# Patient Record
Sex: FEMALE | Race: WHITE | NOT HISPANIC OR LATINO | Employment: FULL TIME | URBAN - NONMETROPOLITAN AREA
[De-identification: names, ages, dates, MRNs, and addresses within clinical notes are randomized per-mention and may not be internally consistent; named-entity substitution may affect disease eponyms.]

---

## 2022-01-05 ENCOUNTER — PROCEDURE VISIT (OUTPATIENT)
Dept: OBSTETRICS AND GYNECOLOGY | Facility: CLINIC | Age: 37
End: 2022-01-05

## 2022-01-05 ENCOUNTER — LAB (OUTPATIENT)
Dept: LAB | Facility: HOSPITAL | Age: 37
End: 2022-01-05

## 2022-01-05 VITALS — DIASTOLIC BLOOD PRESSURE: 72 MMHG | SYSTOLIC BLOOD PRESSURE: 124 MMHG | WEIGHT: 167.2 LBS

## 2022-01-05 DIAGNOSIS — Z30.46 ENCOUNTER FOR REMOVAL AND REINSERTION OF NEXPLANON: Primary | ICD-10-CM

## 2022-01-05 DIAGNOSIS — Z83.49 FAMILY HISTORY OF THYROID DISEASE: ICD-10-CM

## 2022-01-05 LAB
B-HCG UR QL: NEGATIVE
EXPIRATION DATE: NORMAL
INTERNAL NEGATIVE CONTROL: NEGATIVE
INTERNAL POSITIVE CONTROL: POSITIVE
Lab: NORMAL
TSH SERPL DL<=0.05 MIU/L-ACNC: 1.77 UIU/ML (ref 0.27–4.2)

## 2022-01-05 PROCEDURE — 99203 OFFICE O/P NEW LOW 30 MIN: CPT | Performed by: STUDENT IN AN ORGANIZED HEALTH CARE EDUCATION/TRAINING PROGRAM

## 2022-01-05 PROCEDURE — 84443 ASSAY THYROID STIM HORMONE: CPT

## 2022-01-05 PROCEDURE — 11983 REMOVE/INSERT DRUG IMPLANT: CPT | Performed by: STUDENT IN AN ORGANIZED HEALTH CARE EDUCATION/TRAINING PROGRAM

## 2022-01-05 PROCEDURE — 36415 COLL VENOUS BLD VENIPUNCTURE: CPT

## 2022-01-05 PROCEDURE — 81025 URINE PREGNANCY TEST: CPT | Performed by: STUDENT IN AN ORGANIZED HEALTH CARE EDUCATION/TRAINING PROGRAM

## 2022-01-05 RX ORDER — MULTIPLE VITAMINS W/ MINERALS TAB 9MG-400MCG
1 TAB ORAL DAILY
COMMUNITY

## 2022-01-05 NOTE — PROGRESS NOTES
[facility]  Gynecology  Date of Service: 2022    CC: desires Nexplanon removal and reinsertion    HPI  Aissatou Sanders is a 36 y.o.  premenopausal female who presents with desire for Nexplanon removal and reinsertion. Nexplanon previously placed 2018 by Ila Anne in [place]. Has had 2 prior and did well with them all. Reports regular periods, lighter with Nexplanon in place. Initially had Nexplanon placed for symptomatic ovarian cysts. States occasionally has pain she attributes to cysts but not as bad and less frequent. Periods occur monthly, 2-3 days are heavier where may change a pad every 2 hours, but then lighter x 3-4 days. Occasional cramping before periods and takes OTC meds.     2004 pap NIL. States last pap in [place] in 2019. Record requested.     Mother and siblings with h/o thyroid disease, usually gets tested regularly but has not for some time. Denies symptoms of hypo/hyperthyroidism, specifically without heat/cold intolerance.    Denies any vaginal itching, burning, irritation, or discharge. Denies any abnormal uterine bleeding. Denies any sexual dysfunction concerns. Denies any urinary symptoms including incontinence, dysuria, frequency, urgency, nocturia.    ROS  Review of Systems   Constitutional: Negative.    HENT: Negative.    Eyes: Negative.    Respiratory: Negative.    Cardiovascular: Negative.    Gastrointestinal: Negative.    Endocrine: Negative.    Genitourinary: Negative.    Musculoskeletal: Negative.    Skin: Negative.    Psychiatric/Behavioral: Negative.        GYN HISTORY  Menarche: 13-12 yo  Menses: see above  History of STIs: denies  Last pap smear: reports 2019, record requested  Last Completed Pap Smear     This patient has no relevant Health Maintenance data.        Abnormal pap smear history: none  Contraception: Nexplanon     OB HISTORY  OB History    Para Term  AB Living   3       1 2   SAB IAB Ectopic Molar Multiple  Live Births       1     2      # Outcome Date GA Lbr Umang/2nd Weight Sex Delivery Anes PTL Lv   3             2             1 Ectopic              PAST MEDICAL HISTORY  Past Medical History:   Diagnosis Date   • Ovarian cyst      PAST SURGICAL HISTORY  Past Surgical History:   Procedure Laterality Date   • ECTOPIC PREGNANCY SURGERY      RIGHT TUBE REMOVAL   • WISDOM TOOTH EXTRACTION       FAMILY HISTORY  Family History   Problem Relation Age of Onset   • Thyroid disease Mother    • Diabetes Father    • Hypertension Father    • Thyroid disease Sister      SOCIAL HISTORY  Social History     Socioeconomic History   • Marital status:    Tobacco Use   • Smoking status: Never Smoker   • Smokeless tobacco: Never Used   Vaping Use   • Vaping Use: Never used   Substance and Sexual Activity   • Alcohol use: Yes     Comment: OCCASIONALLY   • Drug use: Never   • Sexual activity: Yes     Partners: Male     Birth control/protection: Implant     ALLERGIES  No Known Allergies  HOME MEDICATIONS  Prior to Admission medications    Not on File     PE  /72 (BP Location: Left arm, Patient Position: Sitting, Cuff Size: Adult)   Wt 75.8 kg (167 lb 3.2 oz)   LMP 2022 (Exact Date)   Breastfeeding No        General: Alert, healthy, no distress, well nourished and well developed.  Neurologic: Alert, oriented to person, place, and time.  Gait normal.  Cranial nerves II-XII grossly intact.  HEENT: Normocephalic, atraumatic.  Extraocular muscles intact.  Skin: No rash, no lesions.  Extremities: No cyanosis, clubbing or edema.      IMPRESSION  Aissatou Sanders is a 36 y.o.  presenting for Nexplanon removal and reinsertion.    PLAN    1. Family history of thyroid disease  - TSH; Future    2. Encounter for removal and reinsertion of Nexplanon  - POC Pregnancy, Urine-negative  - uncomplicated removal and reinsertion left arm, as below  - Due for well-woman exam, to make appointment to followup for this  within next month             Psychiatric  Gynecology  Procedure Note: Nexplanon Removal and Reinsertion    Patient's last menstrual period was 01/01/2022 (exact date).    Date of procedure:  1/5/2022    Risks and benefits discussed? yes  All questions answered? yes  Consents given by the patient  Written consent obtained? yes    Local anesthesia used:  yes - 2.5 cc's of  Meds; anesthesia local: None, 1% lidocaine    Procedure documentation:    The upper left arm (non-dominant) was marked at the intended site of removal.  The skin was cleansed with an antiseptic solution.  Local anesthesia was injected.  A small incision was created at the distal tip of the implant parallel to the implant.  The implant was removed intact without difficulty, measured to be 4 cm.    The new Nexplanon was placed subdermally without difficulty through the same incision used to remove the prior implant.  The devise was able to be palpated in the arm by both myself and Aissatou.  A clean 4x4 gauze was place over the site then wrapped.    She tolerated the procedure well.  There were no complications.  EBL was minimal.    Nexplanon information  NDC #: 24903-486-36  Lot #: [id number]  Expiration date: 04/9/24    Post procedure instructions: Remove the wrapping in 24 hours and cover with a  band aid if still open.    Follow up needed: PRN          This document has been electronically signed by Celena Arnett DO on January 5, 2022 12:39 CST

## 2022-07-25 ENCOUNTER — OFFICE VISIT (OUTPATIENT)
Dept: OBSTETRICS AND GYNECOLOGY | Facility: CLINIC | Age: 37
End: 2022-07-25

## 2022-07-25 VITALS
HEIGHT: 68 IN | WEIGHT: 168.8 LBS | SYSTOLIC BLOOD PRESSURE: 102 MMHG | BODY MASS INDEX: 25.58 KG/M2 | DIASTOLIC BLOOD PRESSURE: 72 MMHG

## 2022-07-25 DIAGNOSIS — Z01.419 ENCOUNTER FOR GYNECOLOGICAL EXAMINATION WITHOUT ABNORMAL FINDING: ICD-10-CM

## 2022-07-25 DIAGNOSIS — Z30.46 ENCOUNTER FOR SURVEILLANCE OF IMPLANTABLE SUBDERMAL CONTRACEPTIVE: ICD-10-CM

## 2022-07-25 DIAGNOSIS — Z12.4 CERVICAL CANCER SCREENING: Primary | ICD-10-CM

## 2022-07-25 DIAGNOSIS — K64.9 HEMORRHOIDS, UNSPECIFIED HEMORRHOID TYPE: ICD-10-CM

## 2022-07-25 PROCEDURE — 99395 PREV VISIT EST AGE 18-39: CPT | Performed by: STUDENT IN AN ORGANIZED HEALTH CARE EDUCATION/TRAINING PROGRAM

## 2022-07-25 RX ORDER — PAROXETINE HYDROCHLORIDE 20 MG/1
TABLET, FILM COATED ORAL
COMMUNITY
Start: 2022-07-21

## 2022-07-25 RX ORDER — KETOCONAZOLE 20 MG/ML
SHAMPOO TOPICAL
COMMUNITY
Start: 2022-07-21

## 2022-07-28 LAB — REF LAB TEST METHOD: NORMAL
